# Patient Record
Sex: MALE | URBAN - METROPOLITAN AREA
[De-identification: names, ages, dates, MRNs, and addresses within clinical notes are randomized per-mention and may not be internally consistent; named-entity substitution may affect disease eponyms.]

---

## 2020-04-29 ENCOUNTER — APPOINTMENT (OUTPATIENT)
Dept: URBAN - METROPOLITAN AREA CLINIC 273 | Age: 65
Setting detail: DERMATOLOGY
End: 2020-04-29

## 2020-04-29 ENCOUNTER — RX ONLY (RX ONLY)
Age: 65
End: 2020-04-29

## 2020-04-29 PROBLEM — C44.319 BASAL CELL CARCINOMA OF SKIN OF OTHER PARTS OF FACE: Status: ACTIVE | Noted: 2020-04-29

## 2020-04-29 PROCEDURE — 13132 CMPLX RPR F/C/C/M/N/AX/G/H/F: CPT

## 2020-04-29 PROCEDURE — 17311 MOHS 1 STAGE H/N/HF/G: CPT

## 2020-04-29 PROCEDURE — OTHER MOHS SURGERY: OTHER

## 2020-04-29 NOTE — HPI: SKIN LESION (BASAL CELL CARCINOMA)
Is This A New Presentation, Or A Follow-Up?: Basal Cell Carcinoma
When Was Basal Cell Biopsied? (Optional): 2/13/20

## 2022-09-21 ENCOUNTER — APPOINTMENT (OUTPATIENT)
Dept: URBAN - METROPOLITAN AREA CLINIC 273 | Age: 67
Setting detail: DERMATOLOGY
End: 2022-09-22

## 2022-09-21 ENCOUNTER — RX ONLY (RX ONLY)
Age: 67
End: 2022-09-21

## 2022-09-21 PROBLEM — C44.311 BASAL CELL CARCINOMA OF SKIN OF NOSE: Status: ACTIVE | Noted: 2022-09-21

## 2022-09-21 PROCEDURE — OTHER MOHS SURGERY: OTHER

## 2022-09-21 PROCEDURE — 17311 MOHS 1 STAGE H/N/HF/G: CPT

## 2022-09-21 PROCEDURE — 13151 CMPLX RPR E/N/E/L 1.1-2.5 CM: CPT

## 2022-09-21 RX ORDER — CEPHALEXIN 500 MG/1
CAPSULE ORAL
Qty: 14 | Refills: 0 | Status: ERX | COMMUNITY
Start: 2022-09-21

## 2022-09-21 NOTE — PROCEDURE: MOHS SURGERY
Calm/Appropriate Bilateral Helical Rim Advancement Flap Text: The defect edges were debeveled with a #15 blade scalpel.  Given the location of the defect and the proximity to free margins (helical rim) a bilateral helical rim advancement flap was deemed most appropriate.  Using a sterile surgical marker, the appropriate advancement flaps were drawn incorporating the defect and placing the expected incisions between the helical rim and antihelix where possible.  The area thus outlined was incised through and through with a #15 scalpel blade.  With a skin hook and iris scissors, the flaps were gently and sharply undermined and freed up.

## 2022-09-21 NOTE — HPI: SKIN LESION (BASAL CELL CARCINOMA)
Is This A New Presentation, Or A Follow-Up?: Basal Cell Carcinoma
When Was Basal Cell Biopsied? (Optional): Per pcp dr wagner 8/9/22

## 2022-10-20 NOTE — PROCEDURE: MOHS SURGERY
[Seasonal Allergies] : seasonal allergies [Post Nasal Drip] : post nasal drip [Nasal Congestion] : nasal congestion [Palpitations] : palpitations [Cough] : cough [Negative] : Heme/Lymph [FreeTextEntry9] : joint aches Ear Wedge Repair Text: A wedge excision was completed by carrying down an excision through the full thickness of the ear and cartilage with an inward facing Burow's triangle. The wound was then closed in a layered fashion.

## 2023-02-09 NOTE — PROCEDURE: MOHS SURGERY
Ostomy RN in to see patient. No concerns from wound or ostomy standpoint. Plan is for tomorrow to meet with  and patient for ostomy appliance change. Plan discussed with patient and Kary RN. Time 10 min.   Purse String (Intermediate) Text: Given the location of the defect and the characteristics of the surrounding skin a purse string intermediate closure was deemed most appropriate.  Undermining was performed circumfirentially around the surgical defect.  A purse string suture was then placed and tightened.